# Patient Record
Sex: FEMALE | Race: WHITE | NOT HISPANIC OR LATINO | Employment: FULL TIME | URBAN - METROPOLITAN AREA
[De-identification: names, ages, dates, MRNs, and addresses within clinical notes are randomized per-mention and may not be internally consistent; named-entity substitution may affect disease eponyms.]

---

## 2020-01-26 ENCOUNTER — HOSPITAL ENCOUNTER (OUTPATIENT)
Facility: OTHER | Age: 60
Discharge: HOME | End: 2020-01-28
Attending: EMERGENCY MEDICINE | Admitting: EMERGENCY MEDICINE
Payer: COMMERCIAL

## 2020-01-26 DIAGNOSIS — K62.5 RECTAL BLEEDING: ICD-10-CM

## 2020-01-26 DIAGNOSIS — K55.9 ISCHEMIC COLITIS: Primary | ICD-10-CM

## 2020-01-26 DIAGNOSIS — K52.9 COLITIS: ICD-10-CM

## 2020-01-26 DIAGNOSIS — K92.2 LOWER GI BLEED: ICD-10-CM

## 2020-01-26 PROBLEM — K92.1 HEMATOCHEZIA: Status: ACTIVE | Noted: 2020-01-26

## 2020-01-26 LAB
ABO + RH BLD: NORMAL
ALBUMIN SERPL BCP-MCNC: 4.3 G/DL (ref 3.5–5.2)
ALP SERPL-CCNC: 89 U/L (ref 55–135)
ALT SERPL W/O P-5'-P-CCNC: 31 U/L (ref 10–44)
ANION GAP SERPL CALC-SCNC: 11 MMOL/L (ref 8–16)
AST SERPL-CCNC: 30 U/L (ref 10–40)
B-HCG UR QL: NEGATIVE
BASOPHILS # BLD AUTO: 0.03 K/UL (ref 0–0.2)
BASOPHILS NFR BLD: 0.3 % (ref 0–1.9)
BILIRUB SERPL-MCNC: 0.5 MG/DL (ref 0.1–1)
BLD GP AB SCN CELLS X3 SERPL QL: NORMAL
BUN SERPL-MCNC: 14 MG/DL (ref 6–20)
CALCIUM SERPL-MCNC: 9.5 MG/DL (ref 8.7–10.5)
CHLORIDE SERPL-SCNC: 105 MMOL/L (ref 95–110)
CO2 SERPL-SCNC: 24 MMOL/L (ref 23–29)
CREAT SERPL-MCNC: 0.8 MG/DL (ref 0.5–1.4)
CTP QC/QA: YES
DIFFERENTIAL METHOD: ABNORMAL
EOSINOPHIL # BLD AUTO: 0.2 K/UL (ref 0–0.5)
EOSINOPHIL NFR BLD: 1.8 % (ref 0–8)
ERYTHROCYTE [DISTWIDTH] IN BLOOD BY AUTOMATED COUNT: 12.5 % (ref 11.5–14.5)
EST. GFR  (AFRICAN AMERICAN): >60 ML/MIN/1.73 M^2
EST. GFR  (NON AFRICAN AMERICAN): >60 ML/MIN/1.73 M^2
GLUCOSE SERPL-MCNC: 126 MG/DL (ref 70–110)
HCT VFR BLD AUTO: 41.3 % (ref 37–48.5)
HGB BLD-MCNC: 13.9 G/DL (ref 12–16)
IMM GRANULOCYTES # BLD AUTO: 0.03 K/UL (ref 0–0.04)
IMM GRANULOCYTES NFR BLD AUTO: 0.3 % (ref 0–0.5)
INR PPP: 0.9 (ref 0.8–1.2)
LYMPHOCYTES # BLD AUTO: 0.7 K/UL (ref 1–4.8)
LYMPHOCYTES NFR BLD: 7.8 % (ref 18–48)
MCH RBC QN AUTO: 30.9 PG (ref 27–31)
MCHC RBC AUTO-ENTMCNC: 33.7 G/DL (ref 32–36)
MCV RBC AUTO: 92 FL (ref 82–98)
MONOCYTES # BLD AUTO: 0.4 K/UL (ref 0.3–1)
MONOCYTES NFR BLD: 4.6 % (ref 4–15)
NEUTROPHILS # BLD AUTO: 7.9 K/UL (ref 1.8–7.7)
NEUTROPHILS NFR BLD: 85.2 % (ref 38–73)
NRBC BLD-RTO: 0 /100 WBC
PLATELET # BLD AUTO: 219 K/UL (ref 150–350)
PMV BLD AUTO: 9.9 FL (ref 9.2–12.9)
POTASSIUM SERPL-SCNC: 4.1 MMOL/L (ref 3.5–5.1)
PROT SERPL-MCNC: 7.3 G/DL (ref 6–8.4)
PROTHROMBIN TIME: 10 SEC (ref 9–12.5)
RBC # BLD AUTO: 4.5 M/UL (ref 4–5.4)
SODIUM SERPL-SCNC: 140 MMOL/L (ref 136–145)
WBC # BLD AUTO: 9.3 K/UL (ref 3.9–12.7)

## 2020-01-26 PROCEDURE — S0030 INJECTION, METRONIDAZOLE: HCPCS | Performed by: EMERGENCY MEDICINE

## 2020-01-26 PROCEDURE — 81025 URINE PREGNANCY TEST: CPT | Performed by: EMERGENCY MEDICINE

## 2020-01-26 PROCEDURE — 99220 PR INITIAL OBSERVATION CARE,LEVL III: CPT | Mod: ,,, | Performed by: PHYSICIAN ASSISTANT

## 2020-01-26 PROCEDURE — 63600175 PHARM REV CODE 636 W HCPCS: Performed by: EMERGENCY MEDICINE

## 2020-01-26 PROCEDURE — 94761 N-INVAS EAR/PLS OXIMETRY MLT: CPT

## 2020-01-26 PROCEDURE — 25500020 PHARM REV CODE 255: Performed by: EMERGENCY MEDICINE

## 2020-01-26 PROCEDURE — 85610 PROTHROMBIN TIME: CPT

## 2020-01-26 PROCEDURE — 25000003 PHARM REV CODE 250: Performed by: INTERNAL MEDICINE

## 2020-01-26 PROCEDURE — 85025 COMPLETE CBC W/AUTO DIFF WBC: CPT

## 2020-01-26 PROCEDURE — 96376 TX/PRO/DX INJ SAME DRUG ADON: CPT

## 2020-01-26 PROCEDURE — 25000003 PHARM REV CODE 250: Performed by: EMERGENCY MEDICINE

## 2020-01-26 PROCEDURE — 99220 PR INITIAL OBSERVATION CARE,LEVL III: ICD-10-PCS | Mod: ,,, | Performed by: PHYSICIAN ASSISTANT

## 2020-01-26 PROCEDURE — G0378 HOSPITAL OBSERVATION PER HR: HCPCS

## 2020-01-26 PROCEDURE — 99285 EMERGENCY DEPT VISIT HI MDM: CPT | Mod: 25

## 2020-01-26 PROCEDURE — 96367 TX/PROPH/DG ADDL SEQ IV INF: CPT

## 2020-01-26 PROCEDURE — 96361 HYDRATE IV INFUSION ADD-ON: CPT

## 2020-01-26 PROCEDURE — 63600175 PHARM REV CODE 636 W HCPCS: Performed by: PHYSICIAN ASSISTANT

## 2020-01-26 PROCEDURE — 86850 RBC ANTIBODY SCREEN: CPT

## 2020-01-26 PROCEDURE — 80053 COMPREHEN METABOLIC PANEL: CPT

## 2020-01-26 PROCEDURE — 96375 TX/PRO/DX INJ NEW DRUG ADDON: CPT | Mod: 59

## 2020-01-26 PROCEDURE — 96365 THER/PROPH/DIAG IV INF INIT: CPT | Mod: 59

## 2020-01-26 RX ORDER — CIPROFLOXACIN 2 MG/ML
400 INJECTION, SOLUTION INTRAVENOUS
Status: COMPLETED | OUTPATIENT
Start: 2020-01-26 | End: 2020-01-26

## 2020-01-26 RX ORDER — POLYETHYLENE GLYCOL 3350, SODIUM SULFATE ANHYDROUS, SODIUM BICARBONATE, SODIUM CHLORIDE, POTASSIUM CHLORIDE 236; 22.74; 6.74; 5.86; 2.97 G/4L; G/4L; G/4L; G/4L; G/4L
2 POWDER, FOR SOLUTION ORAL ONCE
Status: COMPLETED | OUTPATIENT
Start: 2020-01-26 | End: 2020-01-26

## 2020-01-26 RX ORDER — METRONIDAZOLE 500 MG/100ML
500 INJECTION, SOLUTION INTRAVENOUS
Status: COMPLETED | OUTPATIENT
Start: 2020-01-26 | End: 2020-01-26

## 2020-01-26 RX ORDER — SODIUM CHLORIDE 9 MG/ML
INJECTION, SOLUTION INTRAVENOUS CONTINUOUS
Status: DISCONTINUED | OUTPATIENT
Start: 2020-01-26 | End: 2020-01-28 | Stop reason: HOSPADM

## 2020-01-26 RX ORDER — ONDANSETRON 2 MG/ML
4 INJECTION INTRAMUSCULAR; INTRAVENOUS EVERY 6 HOURS PRN
Status: DISCONTINUED | OUTPATIENT
Start: 2020-01-26 | End: 2020-01-28 | Stop reason: HOSPADM

## 2020-01-26 RX ORDER — HYDROMORPHONE HYDROCHLORIDE 1 MG/ML
1 INJECTION, SOLUTION INTRAMUSCULAR; INTRAVENOUS; SUBCUTANEOUS EVERY 4 HOURS PRN
Status: DISCONTINUED | OUTPATIENT
Start: 2020-01-26 | End: 2020-01-28 | Stop reason: HOSPADM

## 2020-01-26 RX ORDER — MORPHINE SULFATE 2 MG/ML
2 INJECTION, SOLUTION INTRAMUSCULAR; INTRAVENOUS EVERY 4 HOURS PRN
Status: DISCONTINUED | OUTPATIENT
Start: 2020-01-26 | End: 2020-01-28 | Stop reason: HOSPADM

## 2020-01-26 RX ORDER — POLYETHYLENE GLYCOL 3350, SODIUM SULFATE ANHYDROUS, SODIUM BICARBONATE, SODIUM CHLORIDE, POTASSIUM CHLORIDE 236; 22.74; 6.74; 5.86; 2.97 G/4L; G/4L; G/4L; G/4L; G/4L
2 POWDER, FOR SOLUTION ORAL ONCE
Status: COMPLETED | OUTPATIENT
Start: 2020-01-27 | End: 2020-01-27

## 2020-01-26 RX ORDER — ONDANSETRON 2 MG/ML
4 INJECTION INTRAMUSCULAR; INTRAVENOUS EVERY 8 HOURS PRN
Status: DISCONTINUED | OUTPATIENT
Start: 2020-01-26 | End: 2020-01-28 | Stop reason: HOSPADM

## 2020-01-26 RX ORDER — SODIUM CHLORIDE 0.9 % (FLUSH) 0.9 %
10 SYRINGE (ML) INJECTION
Status: DISCONTINUED | OUTPATIENT
Start: 2020-01-26 | End: 2020-01-28 | Stop reason: HOSPADM

## 2020-01-26 RX ORDER — SODIUM CHLORIDE 0.9 % (FLUSH) 0.9 %
10 SYRINGE (ML) INJECTION
Status: DISCONTINUED | OUTPATIENT
Start: 2020-01-26 | End: 2020-01-28

## 2020-01-26 RX ORDER — METRONIDAZOLE 500 MG/100ML
500 INJECTION, SOLUTION INTRAVENOUS
Status: DISCONTINUED | OUTPATIENT
Start: 2020-01-27 | End: 2020-01-28 | Stop reason: HOSPADM

## 2020-01-26 RX ORDER — CIPROFLOXACIN 2 MG/ML
400 INJECTION, SOLUTION INTRAVENOUS
Status: DISCONTINUED | OUTPATIENT
Start: 2020-01-27 | End: 2020-01-28 | Stop reason: HOSPADM

## 2020-01-26 RX ORDER — HYDROMORPHONE HYDROCHLORIDE 1 MG/ML
0.5 INJECTION, SOLUTION INTRAMUSCULAR; INTRAVENOUS; SUBCUTANEOUS
Status: COMPLETED | OUTPATIENT
Start: 2020-01-26 | End: 2020-01-26

## 2020-01-26 RX ORDER — ACETAMINOPHEN 325 MG/1
650 TABLET ORAL EVERY 4 HOURS PRN
Status: DISCONTINUED | OUTPATIENT
Start: 2020-01-26 | End: 2020-01-28 | Stop reason: HOSPADM

## 2020-01-26 RX ADMIN — IOHEXOL 75 ML: 350 INJECTION, SOLUTION INTRAVENOUS at 12:01

## 2020-01-26 RX ADMIN — SODIUM CHLORIDE 1000 ML: 0.9 INJECTION, SOLUTION INTRAVENOUS at 12:01

## 2020-01-26 RX ADMIN — HYDROMORPHONE HYDROCHLORIDE 1 MG: 1 INJECTION, SOLUTION INTRAMUSCULAR; INTRAVENOUS; SUBCUTANEOUS at 02:01

## 2020-01-26 RX ADMIN — METRONIDAZOLE 500 MG: 500 INJECTION, SOLUTION INTRAVENOUS at 12:01

## 2020-01-26 RX ADMIN — HYDROMORPHONE HYDROCHLORIDE 0.5 MG: 1 INJECTION, SOLUTION INTRAMUSCULAR; INTRAVENOUS; SUBCUTANEOUS at 12:01

## 2020-01-26 RX ADMIN — POLYETHYLENE GLYCOL 3350, SODIUM SULFATE ANHYDROUS, SODIUM BICARBONATE, SODIUM CHLORIDE, POTASSIUM CHLORIDE 2 L: 236; 22.74; 6.74; 5.86; 2.97 POWDER, FOR SOLUTION ORAL at 05:01

## 2020-01-26 RX ADMIN — CIPROFLOXACIN 400 MG: 2 INJECTION, SOLUTION INTRAVENOUS at 02:01

## 2020-01-26 RX ADMIN — SODIUM CHLORIDE: 0.9 INJECTION, SOLUTION INTRAVENOUS at 02:01

## 2020-01-26 NOTE — ASSESSMENT & PLAN NOTE
- sudden onset of abd pain associated with rectal bleeding, CT scan 1/26/2020 wall thickening of the sigmoid colon and distal/mid descending colon  - reported last colonoscopy normal  - possible ischemic colitis  - for now will continue Flagyl and cipro started in ED  - NPO   - IVF's  - H&H stable, monitor labs  - avoid hypotension  - GI consulted

## 2020-01-26 NOTE — CONSULTS
.[facility]  Gastroenterology  Consult Note    Patient Name: Deborah Richmond  MRN: [id number]  Admission Date: 1/26/2020  Hospital Length of Stay: 0 days  Code Status: Full Code   Primary Care Physician: Primary Doctor No  Principal Problem:<principal problem not specified>    Consults  Subjective:     Chief complaint:  Abdominal pain, blood in stool    HPI:  59-year-old woman who was in her usual state of health until early this morning when she was a woken with moderate to severe crampy lower abdominal pain, worse in the left lower quadrant. She went to the bathroom and had diarrhea.  The pain persisted and her next bowel movement was a bloody.  She denies any fever or chills.  No nausea or vomiting. At this point, she is still having pain but it has been relieved with Dilaudid.  No history of GI bleeding.  Her last colonoscopy was approximately 10 years ago and she says she is due for another one.      Past medical history:  History reviewed. No pertinent past medical history.    Past surgical history:  History reviewed. No pertinent surgical history.    Social history:  Social History     Socioeconomic History    Marital status:      Spouse name: Not on file    Number of children: Not on file    Years of education: Not on file    Highest education level: Not on file   Occupational History    Not on file   Social Needs    Financial resource strain: Not on file    Food insecurity:     Worry: Not on file     Inability: Not on file    Transportation needs:     Medical: Not on file     Non-medical: Not on file   Tobacco Use    Smoking status: Never Smoker   Substance and Sexual Activity    Alcohol use: Yes    Drug use: Never    Sexual activity: Yes     Partners: Male   Lifestyle    Physical activity:     Days per week: Not on file     Minutes per session: Not on file    Stress: Not on file   Relationships    Social connections:     Talks on phone: Not on file     Gets  together: Not on file     Attends Mu-ism service: Not on file     Active member of club or organization: Not on file     Attends meetings of clubs or organizations: Not on file     Relationship status: Not on file   Other Topics Concern    Not on file   Social History Narrative    Not on file       Family history:  History reviewed. No pertinent family history.    Medications:  No medications prior to admission.       Allergies:  Review of patient's allergies indicates:  No Known Allergies    Review of systems:  CONSTITUTIONAL: Negative for fever, chills, weakness, weight loss, weight gain.  HEENT: Negative for blurred vision, hearing loss, nasal congestion, dry mouth, sore throat.  CARDIOVASCULAR: Negative for chest pain or palpitations.  RESPIRATORY: Negative for SOB or cough.  GASTROINTESTINAL: See HPI  GENITOURINARY: Negative for dysuria or hematuria.  MUSCULOSKELETAL: Negative for osteoarthritis or muscle pain.  SKIN: Negative for rashes/lesions.  NEUROLOGIC: Negative for headaches, numbness/tingling.  ENDOCRINE: Negative excessive thirst.  HEMATOLOGIC:  No abnormal bruising.  Aside from above positives, complete 10 point review of systems negative.    Objective:     Vital Signs (Most Recent):  Temp: 99 °F (37.2 °C) (01/26/20 1357)  Pulse: (!) 57 (01/26/20 1357)  Resp: 18 (01/26/20 1357)  BP: 129/63 (01/26/20 1357)  SpO2: 99 % (01/26/20 1357) Vital Signs (24h Range):  Temp:  [98.7 °F (37.1 °C)-99.1 °F (37.3 °C)] 99 °F (37.2 °C)  Pulse:  [57-66] 57  Resp:  [18-27] 18  SpO2:  [99 %-100 %] 99 %  BP: (128-151)/(63-73) 129/63     Physical examination:  General: well developed, well nourished, no apparent distress  HENT: NCAT, hearing grossly intact, no palpable or visible thyroid mass  Eyes: PERRL, EOMI, anicteric sclera  LYMH: No cervical, supraclavicular or axillary lymphadenopathy   Cardiovascular: Regular rate and rhythm. No murmurs appreciated.  Lungs: Non-labored respirations. Breath sounds equal.    Abdomen: soft, mild left lower quadrant tenderness (after pain medications), no distention, no rebound, hypoactive bowel sounds  Extremities: No C/C/E, 2+ dorsalis pedis pulses bilaterally  Neuro: AA&O x 3, no asterixes or tremors  Psych: Appropriate mood and affect. No SI.  Skin: No jaundice, rashes or lesions  Musculoskeletal:  No deformity    Labs:  CBC:   Recent Labs   Lab 01/26/20  1053   WBC 9.30   HGB 13.9   HCT 41.3        CMP:   Recent Labs   Lab 01/26/20  1053   *   CALCIUM 9.5   ALBUMIN 4.3   PROT 7.3      K 4.1   CO2 24      BUN 14   CREATININE 0.8   ALKPHOS 89   ALT 31   AST 30   BILITOT 0.5       Imaging:  Imaging results within the past 24 hours have been reviewed.  CT:  1. Liquid stool within the rectum noting there is upstream wall thickening of the sigmoid colon and distal/mid descending colon suggesting sequela of infectious or inflammatory colitis.  Correlation is advised.  Follow-up colonoscopy is recommended to exclude underlying malignancy.  2. Hepatic steatosis with hepatomegaly, correlation with LFTs recommended.    Assessment:   Abdominal pain, diarrhea, hematochezia consistent with ischemic colitis.  CT findings are also consistent with this diagnosis.  A colonoscopy will be performed to confirm the diagnosis.       Plan:   1.  Colonoscopy tomorrow  2.  Keep well hydrated  3.  Pain control as needed  4.  Clear liquid diet and NPO after midnight         Thank you for your consult.     Paul Stone MD  Gastroenterology  [facility]

## 2020-01-26 NOTE — ED PROVIDER NOTES
"Encounter Date: 1/26/2020    SCRIBE #1 NOTE: I, Mariaa Galeas, am scribing for, and in the presence of, Dr. Brown.       History     Chief Complaint   Patient presents with    Rectal Bleeding     Pt reports rectal bleeding (pink)  which started at 2 am, states "It started with an upset stomach, then I started bleeding & cramping."     Time seen by provider: 10:31 AM    This is a 59 y.o. female with no significant medical hx who presents with complaint of rectal bleeding.  She reports waking up with 7/10 lower abdominal pain followed by diarrhea which was then followed by rectal bleeding described as dark red liquid 8.5 hours ago. The patient reports having at least 6 episodes of abdominal cramping followed by rectal bleeding since onset. She also reports one episode of chills. She denies blood dripping in her underwear - she states that she gets cramping, sits on the toilet, and then passes blood. The patient reports she last ate eggs and hash browns 11.5 hours ago and felt fine prior to onset of symptoms. She denies eating red foods such as beets recently. She denies fever, nausea, vomiting, CP, SOB, dysuria, rash, vaginal bleeding, hematuria, fatigue, dizziness, or flu-like symptoms. The patient denies hx of hemorrhoids and states her last colonoscopy was normal. She reports surgical hx of a breast augmentation. She reports alcohol use but denies tobacco or drug use.    The history is provided by the patient.     Review of patient's allergies indicates:  No Known Allergies  History reviewed. No pertinent past medical history.  No past surgical history on file.  History reviewed. No pertinent family history.  Social History     Tobacco Use    Smoking status: Not on file   Substance Use Topics    Alcohol use: Not on file    Drug use: Not on file     Review of Systems   Constitutional: Positive for chills. Negative for fatigue and fever.   HENT: Negative for congestion and sore throat.    Eyes: Negative for " visual disturbance.   Respiratory: Negative for cough and shortness of breath.    Cardiovascular: Negative for chest pain and palpitations.   Gastrointestinal: Positive for abdominal pain, anal bleeding and diarrhea. Negative for nausea and vomiting.   Genitourinary: Negative for decreased urine volume, dysuria, hematuria and vaginal discharge.   Musculoskeletal: Negative for joint swelling, neck pain and neck stiffness.   Skin: Negative for rash and wound.   Neurological: Negative for dizziness, weakness and numbness.   Psychiatric/Behavioral: Negative for confusion.   All other systems reviewed and are negative.      Physical Exam     Initial Vitals [01/26/20 0928]   BP Pulse Resp Temp SpO2   128/65 66 18 99.1 °F (37.3 °C) 100 %      MAP       --         Physical Exam    Nursing note and vitals reviewed.  Constitutional: She appears well-developed and well-nourished. She is not diaphoretic. No distress.   HENT:   Head: Normocephalic and atraumatic.   Right Ear: External ear normal.   Left Ear: External ear normal.   Nose: Nose normal.   Mouth/Throat: Oropharynx is clear and moist. No oropharyngeal exudate.   Eyes: Conjunctivae and EOM are normal. Pupils are equal, round, and reactive to light. No scleral icterus.   Neck: Normal range of motion. Neck supple. No JVD present.   Cardiovascular: Normal rate, regular rhythm, normal heart sounds and intact distal pulses. Exam reveals no gallop and no friction rub.    No murmur heard.  Pulmonary/Chest: Breath sounds normal. No respiratory distress. She has no wheezes. She has no rhonchi. She has no rales.   Abdominal: Soft. There is tenderness. There is no rebound and no guarding.   Hyperactive bowel sounds.  Tenderness in suprapubic area and bilateral lower quadrants.   Genitourinary:   Genitourinary Comments: Minimal to no stool present.  No obvious internal or external hemorrhoids.  External skin tag.  Hemoccult negative.   Musculoskeletal: Normal range of motion. She  exhibits no edema or tenderness.   No leg tenderness or edema.   Neurological: She is alert and oriented to person, place, and time. GCS score is 15. GCS eye subscore is 4. GCS verbal subscore is 5. GCS motor subscore is 6.   Skin: Skin is warm and dry. No rash noted. No erythema. No pallor.   Psychiatric: She has a normal mood and affect. Thought content normal.         ED Course   Procedures  Labs Reviewed   CBC W/ AUTO DIFFERENTIAL - Abnormal; Notable for the following components:       Result Value    Gran # (ANC) 7.9 (*)     Lymph # 0.7 (*)     Gran% 85.2 (*)     Lymph% 7.8 (*)     All other components within normal limits   COMPREHENSIVE METABOLIC PANEL - Abnormal; Notable for the following components:    Glucose 126 (*)     All other components within normal limits   PROTIME-INR   POCT URINE PREGNANCY   TYPE & SCREEN          Imaging Results           CT Abdomen Pelvis With Contrast (Final result)  Result time 01/26/20 12:34:22    Final result by Tadeo Guo MD (01/26/20 12:34:22)                 Impression:      This report was flagged in Epic as abnormal.    1. Liquid stool within the rectum noting there is upstream wall thickening of the sigmoid colon and distal/mid descending colon suggesting sequela of infectious or inflammatory colitis.  Correlation is advised.  Follow-up colonoscopy is recommended to exclude underlying malignancy.  2. Hepatic steatosis with hepatomegaly, correlation with LFTs recommended.  3. Additional findings above.      Electronically signed by: Tadeo Guo MD  Date:    01/26/2020  Time:    12:34             Narrative:    EXAMINATION:  CT ABDOMEN PELVIS WITH CONTRAST    CLINICAL HISTORY:  GI bleeding;LLQ pain, suspect diverticulitis;    TECHNIQUE:  Low dose axial images, sagittal and coronal reformations were obtained from the lung bases to the pubic symphysis following the IV administration of 75 mL of Omnipaque 350 .  Oral contrast was not  given.    COMPARISON:  None.    FINDINGS:  Images of the lower thorax are remarkable for a calcified granuloma within the right lower lobe.  There is bilateral basilar dependent atelectasis/scarring.    The liver is hypoattenuating, may reflect steatosis, correlation with LFTs recommended.  The liver is enlarged.  The spleen, pancreas, gallbladder and adrenal glands are unremarkable.  There is no biliary dilation or ascites.  The portal vein, splenic vein, SMV, celiac axis and SMA all are patent.  The stomach is decompressed limiting evaluation.  No significant abdominal lymphadenopathy.    The kidneys enhance symmetrically without hydronephrosis or nephrolithiasis.  The bilateral ureters are unremarkable without calculi seen.  Please note, the ureters are unable to be followed in their entirety to the urinary bladder, no secondary findings to suggest obstructive uropathy.  The urinary bladder is decompressed.  The uterus and adnexa is unremarkable.    There is liquid stool within the rectum.  There is inflammation and wall thickening involving the mid to distal sigmoid colon, with additional regions of inflammation involving the distal/mid descending colon.  The terminal ileum is unremarkable.  The appendix is not confidently identified, no pericecal inflammation.  No focal organized pelvic fluid collection.    Degenerative changes are noted of the spine.  No significant inguinal lymphadenopathy.  Breast prostheses noted.                                 Medical Decision Making:   History:   Old Medical Records: I decided to obtain old medical records.  Clinical Tests:   Lab Tests: Ordered and Reviewed  ED Management:  Emergent evaluation a 59-year-old female who presents with complaint of rectal bleeding.  Vital signs are benign, afebrile.  On exam there is no stool present in the rectum, no obvious bleeding hemorrhoids.  Abdomen is tender without surgical sign. CT scan shows colitis and liquid stool present in the  rectum.  I am concerned for possible diverticular bleed.  H&H is within normal limits, no anemia or need for emergent transfusion.  She is treated with IV metronidazole and Cipro for colitis.  She is admitted to the hospitalist service for further care and monitoring.            Scribe Attestation:   Scribe #1: I performed the above scribed service and the documentation accurately describes the services I performed. I attest to the accuracy of the note.    Attending Attestation:           Physician Attestation for Scribe:  Physician Attestation Statement for Scribe #1: I, Dr. Brown, reviewed documentation, as scribed by Mariaa Galeas in my presence, and it is both accurate and complete.                               Clinical Impression:     1. Lower GI bleed    2. Colitis                                Ashlee Brown MD  01/26/20 7169

## 2020-01-26 NOTE — HPI
60 y/o female with no significant PMH presents with c/o sudden onset of abdominal pain with associated diarrhea, rectal bleeding and chills. She reports multiple episodes of abdominal cramping followed by rectal bleeding since onset. Patient reports she is visiting and went out last night and had a couple of drinks, nothing out of the ordinary, ate eggs and hash browns last night and felt fine prior to onset of symptoms. Denies fever, nausea, vomiting, CP, SOB, dysuria, rash, vaginal bleeding, hematuria, fatigue, dizziness, or flu-like symptoms. The patient denies hx of hemorrhoids and states her last colonoscopy was normal. She reports alcohol use but denies tobacco or drug use. ED evaluation labs and vitals unremarkable, CT A/P showed Liquid stool within the rectum noting there is upstream wall thickening of the sigmoid colon and distal/mid descending colon suggesting sequela of infectious or inflammatory colitis. Patient placed in Observation.

## 2020-01-26 NOTE — H&P
"[facility] Hospital Medicine  History & Physical    Patient Name: Deborah Richmond  MRN: [id number]  Admission Date: 1/26/2020  Attending Physician: MATTY Mac MD   Primary Care Provider: Primary Doctor No         Patient information was obtained from patient, spouse/SO and ER records.     Subjective:     Principal Problem:<principal problem not specified>    Chief Complaint:   Chief Complaint   Patient presents with    Rectal Bleeding     Pt reports rectal bleeding (pink)  which started at 2 am, states "It started with an upset stomach, then I started bleeding & cramping."        HPI: 58 y/o female with no significant PMH presents with c/o sudden onset of abdominal pain with associated diarrhea, rectal bleeding and chills. She reports multiple episodes of abdominal cramping followed by rectal bleeding since onset. Patient reports she is visiting and went out last night and had a couple of drinks, nothing out of the ordinary, ate eggs and hash browns last night and felt fine prior to onset of symptoms. Denies fever, nausea, vomiting, CP, SOB, dysuria, rash, vaginal bleeding, hematuria, fatigue, dizziness, or flu-like symptoms. The patient denies hx of hemorrhoids and states her last colonoscopy was normal. She reports alcohol use but denies tobacco or drug use. ED evaluation labs and vitals unremarkable, CT A/P showed Liquid stool within the rectum noting there is upstream wall thickening of the sigmoid colon and distal/mid descending colon suggesting sequela of infectious or inflammatory colitis. Patient placed in Observation.     History reviewed. No pertinent past medical history.    History reviewed. No pertinent surgical history.    Review of patient's allergies indicates:  No Known Allergies    No current facility-administered medications on file prior to encounter.      No current outpatient medications on file prior to encounter.     Family History     None        Tobacco Use    " Smoking status: Never Smoker   Substance and Sexual Activity    Alcohol use: Yes    Drug use: Never    Sexual activity: Yes     Partners: Male     Review of Systems   Constitutional: Negative for chills and fever.   HENT: Negative for congestion and trouble swallowing.    Eyes: Negative for discharge and visual disturbance.   Respiratory: Negative for cough, chest tightness and shortness of breath.    Cardiovascular: Negative for chest pain, palpitations and leg swelling.   Gastrointestinal: Positive for abdominal pain, blood in stool and diarrhea. Negative for abdominal distention, nausea and vomiting.   Genitourinary: Negative for difficulty urinating, dysuria, frequency, hematuria and urgency.   Musculoskeletal: Negative for arthralgias and myalgias.   Skin: Negative for rash and wound.   Neurological: Negative for dizziness, syncope, light-headedness, numbness and headaches.   Hematological: Does not bruise/bleed easily.   Psychiatric/Behavioral: Negative for confusion.     Objective:     Vital Signs (Most Recent):  Temp: 99 °F (37.2 °C) (01/26/20 1357)  Pulse: (!) 57 (01/26/20 1357)  Resp: 18 (01/26/20 1357)  BP: 129/63 (01/26/20 1357)  SpO2: 99 % (01/26/20 1357) Vital Signs (24h Range):  Temp:  [98.7 °F (37.1 °C)-99.1 °F (37.3 °C)] 99 °F (37.2 °C)  Pulse:  [57-66] 57  Resp:  [18-27] 18  SpO2:  [99 %-100 %] 99 %  BP: (128-151)/(63-73) 129/63     Weight: 70.6 kg (155 lb 10.3 oz)  Body mass index is 24.38 kg/m².    Physical Exam   Constitutional: She is oriented to person, place, and time. She appears well-developed and well-nourished. No distress.   HENT:   Head: Normocephalic and atraumatic.   Eyes: Conjunctivae are normal. No scleral icterus.   Neck: Normal range of motion. Neck supple.   Cardiovascular: Normal rate, regular rhythm, normal heart sounds and intact distal pulses.   Pulmonary/Chest: Effort normal and breath sounds normal. No respiratory distress. She has no wheezes.   Abdominal: Soft. Bowel  sounds are normal. She exhibits no distension and no mass. There is tenderness (TTP RLQ). There is no rebound.   Musculoskeletal: Normal range of motion. She exhibits no edema.   Neurological: She is alert and oriented to person, place, and time.   Skin: Skin is warm and dry. Capillary refill takes less than 2 seconds. She is not diaphoretic.   Psychiatric: She has a normal mood and affect.   Nursing note and vitals reviewed.          Significant Labs:   CBC:   Recent Labs   Lab 01/26/20  1053   WBC 9.30   HGB 13.9   HCT 41.3        CMP:   Recent Labs   Lab 01/26/20  1053      K 4.1      CO2 24   *   BUN 14   CREATININE 0.8   CALCIUM 9.5   PROT 7.3   ALBUMIN 4.3   BILITOT 0.5   ALKPHOS 89   AST 30   ALT 31   ANIONGAP 11   EGFRNONAA >60     All pertinent labs within the past 24 hours have been reviewed.    Significant Imaging: I have reviewed all pertinent imaging results/findings within the past 24 hours.   Imaging Results           CT Abdomen Pelvis With Contrast (Final result)  Result time 01/26/20 12:34:22    Final result by Tadeo Guo MD (01/26/20 12:34:22)                 Impression:      This report was flagged in Epic as abnormal.    1. Liquid stool within the rectum noting there is upstream wall thickening of the sigmoid colon and distal/mid descending colon suggesting sequela of infectious or inflammatory colitis.  Correlation is advised.  Follow-up colonoscopy is recommended to exclude underlying malignancy.  2. Hepatic steatosis with hepatomegaly, correlation with LFTs recommended.  3. Additional findings above.      Electronically signed by: Tadeo Guo MD  Date:    01/26/2020  Time:    12:34             Narrative:    EXAMINATION:  CT ABDOMEN PELVIS WITH CONTRAST    CLINICAL HISTORY:  GI bleeding;LLQ pain, suspect diverticulitis;    TECHNIQUE:  Low dose axial images, sagittal and coronal reformations were obtained from the lung bases to the pubic symphysis following  the IV administration of 75 mL of Omnipaque 350 .  Oral contrast was not given.    COMPARISON:  None.    FINDINGS:  Images of the lower thorax are remarkable for a calcified granuloma within the right lower lobe.  There is bilateral basilar dependent atelectasis/scarring.    The liver is hypoattenuating, may reflect steatosis, correlation with LFTs recommended.  The liver is enlarged.  The spleen, pancreas, gallbladder and adrenal glands are unremarkable.  There is no biliary dilation or ascites.  The portal vein, splenic vein, SMV, celiac axis and SMA all are patent.  The stomach is decompressed limiting evaluation.  No significant abdominal lymphadenopathy.    The kidneys enhance symmetrically without hydronephrosis or nephrolithiasis.  The bilateral ureters are unremarkable without calculi seen.  Please note, the ureters are unable to be followed in their entirety to the urinary bladder, no secondary findings to suggest obstructive uropathy.  The urinary bladder is decompressed.  The uterus and adnexa is unremarkable.    There is liquid stool within the rectum.  There is inflammation and wall thickening involving the mid to distal sigmoid colon, with additional regions of inflammation involving the distal/mid descending colon.  The terminal ileum is unremarkable.  The appendix is not confidently identified, no pericecal inflammation.  No focal organized pelvic fluid collection.    Degenerative changes are noted of the spine.  No significant inguinal lymphadenopathy.  Breast prostheses noted.                                  Assessment/Plan:     Colitis  - sudden onset of abd pain associated with rectal bleeding, CT scan 1/26/2020 wall thickening of the sigmoid colon and distal/mid descending colon  - reported last colonoscopy normal  - possible ischemic colitis  - for now will continue Flagyl and cipro started in ED  - NPO   - IVF's  - H&H stable, monitor labs  - avoid hypotension  - GI consulted      Rectal  bleeding  - see colitis      VTE Risk Mitigation (From admission, onward)         Ordered     Place sequential compression device  Until discontinued      01/26/20 1400     IP VTE LOW RISK PATIENT  Once      01/26/20 1400                   Rita Smith PA-C  Department of Hospital Medicine   [facility]

## 2020-01-27 ENCOUNTER — ANESTHESIA (OUTPATIENT)
Dept: ENDOSCOPY | Facility: OTHER | Age: 60
End: 2020-01-27
Payer: COMMERCIAL

## 2020-01-27 ENCOUNTER — ANESTHESIA EVENT (OUTPATIENT)
Dept: ENDOSCOPY | Facility: OTHER | Age: 60
End: 2020-01-27
Payer: COMMERCIAL

## 2020-01-27 LAB
ALBUMIN SERPL BCP-MCNC: 3.6 G/DL (ref 3.5–5.2)
ALP SERPL-CCNC: 70 U/L (ref 55–135)
ALT SERPL W/O P-5'-P-CCNC: 22 U/L (ref 10–44)
ANION GAP SERPL CALC-SCNC: 8 MMOL/L (ref 8–16)
AST SERPL-CCNC: 19 U/L (ref 10–40)
BASOPHILS # BLD AUTO: 0.03 K/UL (ref 0–0.2)
BASOPHILS NFR BLD: 0.3 % (ref 0–1.9)
BILIRUB SERPL-MCNC: 0.9 MG/DL (ref 0.1–1)
BUN SERPL-MCNC: 7 MG/DL (ref 6–20)
CALCIUM SERPL-MCNC: 8.9 MG/DL (ref 8.7–10.5)
CHLORIDE SERPL-SCNC: 106 MMOL/L (ref 95–110)
CO2 SERPL-SCNC: 28 MMOL/L (ref 23–29)
CREAT SERPL-MCNC: 0.8 MG/DL (ref 0.5–1.4)
DIFFERENTIAL METHOD: NORMAL
EOSINOPHIL # BLD AUTO: 0 K/UL (ref 0–0.5)
EOSINOPHIL NFR BLD: 0.2 % (ref 0–8)
ERYTHROCYTE [DISTWIDTH] IN BLOOD BY AUTOMATED COUNT: 12.7 % (ref 11.5–14.5)
EST. GFR  (AFRICAN AMERICAN): >60 ML/MIN/1.73 M^2
EST. GFR  (NON AFRICAN AMERICAN): >60 ML/MIN/1.73 M^2
GLUCOSE SERPL-MCNC: 124 MG/DL (ref 70–110)
HCT VFR BLD AUTO: 41.6 % (ref 37–48.5)
HGB BLD-MCNC: 13.3 G/DL (ref 12–16)
IMM GRANULOCYTES # BLD AUTO: 0.02 K/UL (ref 0–0.04)
IMM GRANULOCYTES NFR BLD AUTO: 0.2 % (ref 0–0.5)
LYMPHOCYTES # BLD AUTO: 1.8 K/UL (ref 1–4.8)
LYMPHOCYTES NFR BLD: 19.6 % (ref 18–48)
MAGNESIUM SERPL-MCNC: 1.7 MG/DL (ref 1.6–2.6)
MCH RBC QN AUTO: 30.2 PG (ref 27–31)
MCHC RBC AUTO-ENTMCNC: 32 G/DL (ref 32–36)
MCV RBC AUTO: 95 FL (ref 82–98)
MONOCYTES # BLD AUTO: 0.7 K/UL (ref 0.3–1)
MONOCYTES NFR BLD: 7.2 % (ref 4–15)
NEUTROPHILS # BLD AUTO: 6.5 K/UL (ref 1.8–7.7)
NEUTROPHILS NFR BLD: 72.5 % (ref 38–73)
NRBC BLD-RTO: 0 /100 WBC
PHOSPHATE SERPL-MCNC: 3.2 MG/DL (ref 2.7–4.5)
PLATELET # BLD AUTO: 199 K/UL (ref 150–350)
PMV BLD AUTO: 9.7 FL (ref 9.2–12.9)
POTASSIUM SERPL-SCNC: 4.2 MMOL/L (ref 3.5–5.1)
PROT SERPL-MCNC: 6.3 G/DL (ref 6–8.4)
RBC # BLD AUTO: 4.4 M/UL (ref 4–5.4)
SODIUM SERPL-SCNC: 142 MMOL/L (ref 136–145)
WBC # BLD AUTO: 9 K/UL (ref 3.9–12.7)

## 2020-01-27 PROCEDURE — 96376 TX/PRO/DX INJ SAME DRUG ADON: CPT | Mod: 59

## 2020-01-27 PROCEDURE — 99226 PR SUBSEQUENT OBSERVATION CARE,LEVEL III: CPT | Mod: ,,, | Performed by: PHYSICIAN ASSISTANT

## 2020-01-27 PROCEDURE — 25000003 PHARM REV CODE 250: Performed by: INTERNAL MEDICINE

## 2020-01-27 PROCEDURE — 88305 TISSUE EXAM BY PATHOLOGIST: ICD-10-PCS | Mod: 26,,, | Performed by: PATHOLOGY

## 2020-01-27 PROCEDURE — 36415 COLL VENOUS BLD VENIPUNCTURE: CPT

## 2020-01-27 PROCEDURE — 88342 IMHCHEM/IMCYTCHM 1ST ANTB: CPT | Performed by: PATHOLOGY

## 2020-01-27 PROCEDURE — 88305 TISSUE EXAM BY PATHOLOGIST: CPT | Performed by: PATHOLOGY

## 2020-01-27 PROCEDURE — 63600175 PHARM REV CODE 636 W HCPCS: Performed by: PHYSICIAN ASSISTANT

## 2020-01-27 PROCEDURE — 88342 CHG IMMUNOCYTOCHEMISTRY: ICD-10-PCS | Mod: 26,,, | Performed by: PATHOLOGY

## 2020-01-27 PROCEDURE — 63600175 PHARM REV CODE 636 W HCPCS: Performed by: ANESTHESIOLOGY

## 2020-01-27 PROCEDURE — S0030 INJECTION, METRONIDAZOLE: HCPCS | Performed by: PHYSICIAN ASSISTANT

## 2020-01-27 PROCEDURE — 27201012 HC FORCEPS, HOT/COLD, DISP: Performed by: INTERNAL MEDICINE

## 2020-01-27 PROCEDURE — 37000009 HC ANESTHESIA EA ADD 15 MINS: Performed by: INTERNAL MEDICINE

## 2020-01-27 PROCEDURE — 25000003 PHARM REV CODE 250: Performed by: PHYSICIAN ASSISTANT

## 2020-01-27 PROCEDURE — 45380 COLONOSCOPY AND BIOPSY: CPT | Performed by: INTERNAL MEDICINE

## 2020-01-27 PROCEDURE — 83735 ASSAY OF MAGNESIUM: CPT

## 2020-01-27 PROCEDURE — 94761 N-INVAS EAR/PLS OXIMETRY MLT: CPT

## 2020-01-27 PROCEDURE — S0030 INJECTION, METRONIDAZOLE: HCPCS | Performed by: INTERNAL MEDICINE

## 2020-01-27 PROCEDURE — 37000008 HC ANESTHESIA 1ST 15 MINUTES: Performed by: INTERNAL MEDICINE

## 2020-01-27 PROCEDURE — 96375 TX/PRO/DX INJ NEW DRUG ADDON: CPT

## 2020-01-27 PROCEDURE — 63600175 PHARM REV CODE 636 W HCPCS: Performed by: NURSE ANESTHETIST, CERTIFIED REGISTERED

## 2020-01-27 PROCEDURE — 85025 COMPLETE CBC W/AUTO DIFF WBC: CPT

## 2020-01-27 PROCEDURE — G0378 HOSPITAL OBSERVATION PER HR: HCPCS

## 2020-01-27 PROCEDURE — 63600175 PHARM REV CODE 636 W HCPCS: Performed by: INTERNAL MEDICINE

## 2020-01-27 PROCEDURE — 99226 PR SUBSEQUENT OBSERVATION CARE,LEVEL III: ICD-10-PCS | Mod: ,,, | Performed by: PHYSICIAN ASSISTANT

## 2020-01-27 PROCEDURE — 88342 IMHCHEM/IMCYTCHM 1ST ANTB: CPT | Mod: 26,,, | Performed by: PATHOLOGY

## 2020-01-27 PROCEDURE — 80053 COMPREHEN METABOLIC PANEL: CPT

## 2020-01-27 PROCEDURE — 88305 TISSUE EXAM BY PATHOLOGIST: CPT | Mod: 26,,, | Performed by: PATHOLOGY

## 2020-01-27 PROCEDURE — 84100 ASSAY OF PHOSPHORUS: CPT

## 2020-01-27 RX ORDER — MEPERIDINE HYDROCHLORIDE 25 MG/ML
12.5 INJECTION INTRAMUSCULAR; INTRAVENOUS; SUBCUTANEOUS ONCE AS NEEDED
Status: DISCONTINUED | OUTPATIENT
Start: 2020-01-27 | End: 2020-01-27

## 2020-01-27 RX ORDER — ONDANSETRON HYDROCHLORIDE 2 MG/ML
INJECTION, SOLUTION INTRAMUSCULAR; INTRAVENOUS
Status: DISCONTINUED | OUTPATIENT
Start: 2020-01-27 | End: 2020-01-27

## 2020-01-27 RX ORDER — OXYCODONE HYDROCHLORIDE 5 MG/1
5 TABLET ORAL
Status: DISCONTINUED | OUTPATIENT
Start: 2020-01-27 | End: 2020-01-27

## 2020-01-27 RX ORDER — ONDANSETRON 2 MG/ML
4 INJECTION INTRAMUSCULAR; INTRAVENOUS DAILY PRN
Status: DISCONTINUED | OUTPATIENT
Start: 2020-01-27 | End: 2020-01-27

## 2020-01-27 RX ORDER — HYDROMORPHONE HYDROCHLORIDE 2 MG/ML
0.4 INJECTION, SOLUTION INTRAMUSCULAR; INTRAVENOUS; SUBCUTANEOUS EVERY 5 MIN PRN
Status: DISCONTINUED | OUTPATIENT
Start: 2020-01-27 | End: 2020-01-27

## 2020-01-27 RX ORDER — SODIUM CHLORIDE, SODIUM LACTATE, POTASSIUM CHLORIDE, CALCIUM CHLORIDE 600; 310; 30; 20 MG/100ML; MG/100ML; MG/100ML; MG/100ML
INJECTION, SOLUTION INTRAVENOUS CONTINUOUS PRN
Status: DISCONTINUED | OUTPATIENT
Start: 2020-01-27 | End: 2020-01-27

## 2020-01-27 RX ORDER — PROPOFOL 10 MG/ML
VIAL (ML) INTRAVENOUS
Status: DISCONTINUED | OUTPATIENT
Start: 2020-01-27 | End: 2020-01-27

## 2020-01-27 RX ORDER — SODIUM CHLORIDE 0.9 % (FLUSH) 0.9 %
3 SYRINGE (ML) INJECTION
Status: DISCONTINUED | OUTPATIENT
Start: 2020-01-27 | End: 2020-01-28

## 2020-01-27 RX ORDER — LIDOCAINE HCL/PF 100 MG/5ML
SYRINGE (ML) INTRAVENOUS
Status: DISCONTINUED | OUTPATIENT
Start: 2020-01-27 | End: 2020-01-27

## 2020-01-27 RX ADMIN — PROPOFOL 100 MG: 10 INJECTION, EMULSION INTRAVENOUS at 07:01

## 2020-01-27 RX ADMIN — SODIUM CHLORIDE, SODIUM LACTATE, POTASSIUM CHLORIDE, AND CALCIUM CHLORIDE: .6; .31; .03; .02 INJECTION, SOLUTION INTRAVENOUS at 07:01

## 2020-01-27 RX ADMIN — METRONIDAZOLE 500 MG: 500 INJECTION, SOLUTION INTRAVENOUS at 04:01

## 2020-01-27 RX ADMIN — ACETAMINOPHEN 650 MG: 325 TABLET ORAL at 11:01

## 2020-01-27 RX ADMIN — CIPROFLOXACIN 400 MG: 2 INJECTION, SOLUTION INTRAVENOUS at 01:01

## 2020-01-27 RX ADMIN — POLYETHYLENE GLYCOL 3350, SODIUM SULFATE ANHYDROUS, SODIUM BICARBONATE, SODIUM CHLORIDE, POTASSIUM CHLORIDE 2 L: 236; 22.74; 6.74; 5.86; 2.97 POWDER, FOR SOLUTION ORAL at 02:01

## 2020-01-27 RX ADMIN — CIPROFLOXACIN 400 MG: 2 INJECTION, SOLUTION INTRAVENOUS at 02:01

## 2020-01-27 RX ADMIN — METRONIDAZOLE 500 MG: 500 INJECTION, SOLUTION INTRAVENOUS at 08:01

## 2020-01-27 RX ADMIN — LIDOCAINE HYDROCHLORIDE 50 MG: 20 INJECTION, SOLUTION INTRAVENOUS at 07:01

## 2020-01-27 RX ADMIN — METRONIDAZOLE 500 MG: 500 INJECTION, SOLUTION INTRAVENOUS at 12:01

## 2020-01-27 RX ADMIN — ONDANSETRON 4 MG: 2 INJECTION, SOLUTION INTRAMUSCULAR; INTRAVENOUS at 07:01

## 2020-01-27 NOTE — TRANSFER OF CARE
"Anesthesia Transfer of Care Note    Patient: Deborah Richmond    Procedure(s) Performed: Procedure(s) (LRB):  COLONOSCOPY (N/A)    Patient location: PACU    Anesthesia Type: general    Transport from OR: Transported from OR on 2-3 L/min O2 by NC with adequate spontaneous ventilation    Post pain: adequate analgesia    Post assessment: no apparent anesthetic complications    Post vital signs: stable    Level of consciousness: awake, alert and oriented    Nausea/Vomiting: no nausea/vomiting    Complications: none    Transfer of care protocol was followed      Last vitals:   Visit Vitals  BP (!) 114/56 (BP Location: Left arm, Patient Position: Lying)   Pulse (!) 53   Temp 37.1 °C (98.7 °F) (Oral)   Resp 18   Ht 5' 7" (1.702 m)   Wt 70.6 kg (155 lb 10.3 oz)   LMP  (LMP Unknown)   SpO2 100%   Breastfeeding? No   BMI 24.38 kg/m²     "

## 2020-01-27 NOTE — SUBJECTIVE & OBJECTIVE
Interval History: s/p colonoscopy    Review of Systems   Constitutional: Negative for chills and fever.   Respiratory: Negative for cough and shortness of breath.    Cardiovascular: Negative for chest pain, palpitations and leg swelling.   Gastrointestinal: Positive for abdominal pain. Negative for abdominal distention, blood in stool, diarrhea, nausea and vomiting.   Musculoskeletal: Negative for myalgias.   Neurological: Negative for dizziness, syncope and light-headedness.     Objective:     Vital Signs (Most Recent):  Temp: 98.5 °F (36.9 °C) (01/27/20 0826)  Pulse: 88 (01/27/20 0826)  Resp: 16 (01/27/20 0826)  BP: 132/60 (01/27/20 0810)  SpO2: (!) 70 % (01/27/20 0826) Vital Signs (24h Range):  Temp:  [98.2 °F (36.8 °C)-99 °F (37.2 °C)] 98.5 °F (36.9 °C)  Pulse:  [51-88] 88  Resp:  [16-27] 16  SpO2:  [70 %-100 %] 70 %  BP: (114-151)/(55-80) 132/60     Weight: 70.6 kg (155 lb 10.3 oz)  Body mass index is 24.38 kg/m².    Intake/Output Summary (Last 24 hours) at 1/27/2020 0931  Last data filed at 1/27/2020 0826  Gross per 24 hour   Intake 400 ml   Output 0 ml   Net 400 ml      Physical Exam   Constitutional: She is oriented to person, place, and time. She appears well-developed and well-nourished. No distress.   HENT:   Head: Normocephalic and atraumatic.   Eyes: Conjunctivae are normal. No scleral icterus.   Neck: Normal range of motion. Neck supple.   Cardiovascular: Normal rate, regular rhythm, normal heart sounds and intact distal pulses.   Pulmonary/Chest: Effort normal and breath sounds normal. No respiratory distress. She has no wheezes.   Abdominal: Soft. Bowel sounds are normal. She exhibits no distension and no mass. There is tenderness (TTP RLQ). There is no rebound.   Musculoskeletal: Normal range of motion. She exhibits no edema.   Neurological: She is alert and oriented to person, place, and time.   Skin: Skin is warm and dry. Capillary refill takes less than 2 seconds. She is not diaphoretic.    Psychiatric: She has a normal mood and affect.   Nursing note and vitals reviewed.      Significant Labs:   CBC:   Recent Labs   Lab 01/26/20  1053 01/27/20  0414   WBC 9.30 9.00   HGB 13.9 13.3   HCT 41.3 41.6    199     CMP:   Recent Labs   Lab 01/26/20  1053 01/27/20  0414    142   K 4.1 4.2    106   CO2 24 28   * 124*   BUN 14 7   CREATININE 0.8 0.8   CALCIUM 9.5 8.9   PROT 7.3 6.3   ALBUMIN 4.3 3.6   BILITOT 0.5 0.9   ALKPHOS 89 70   AST 30 19   ALT 31 22   ANIONGAP 11 8   EGFRNONAA >60 >60     All pertinent labs within the past 24 hours have been reviewed.    Significant Imaging:   Imaging Results           CT Abdomen Pelvis With Contrast (Final result)  Result time 01/26/20 12:34:22    Final result by Tadeo Guo MD (01/26/20 12:34:22)                 Impression:      This report was flagged in Epic as abnormal.    1. Liquid stool within the rectum noting there is upstream wall thickening of the sigmoid colon and distal/mid descending colon suggesting sequela of infectious or inflammatory colitis.  Correlation is advised.  Follow-up colonoscopy is recommended to exclude underlying malignancy.  2. Hepatic steatosis with hepatomegaly, correlation with LFTs recommended.  3. Additional findings above.      Electronically signed by: Tadeo Guo MD  Date:    01/26/2020  Time:    12:34             Narrative:    EXAMINATION:  CT ABDOMEN PELVIS WITH CONTRAST    CLINICAL HISTORY:  GI bleeding;LLQ pain, suspect diverticulitis;    TECHNIQUE:  Low dose axial images, sagittal and coronal reformations were obtained from the lung bases to the pubic symphysis following the IV administration of 75 mL of Omnipaque 350 .  Oral contrast was not given.    COMPARISON:  None.    FINDINGS:  Images of the lower thorax are remarkable for a calcified granuloma within the right lower lobe.  There is bilateral basilar dependent atelectasis/scarring.    The liver is hypoattenuating, may reflect  steatosis, correlation with LFTs recommended.  The liver is enlarged.  The spleen, pancreas, gallbladder and adrenal glands are unremarkable.  There is no biliary dilation or ascites.  The portal vein, splenic vein, SMV, celiac axis and SMA all are patent.  The stomach is decompressed limiting evaluation.  No significant abdominal lymphadenopathy.    The kidneys enhance symmetrically without hydronephrosis or nephrolithiasis.  The bilateral ureters are unremarkable without calculi seen.  Please note, the ureters are unable to be followed in their entirety to the urinary bladder, no secondary findings to suggest obstructive uropathy.  The urinary bladder is decompressed.  The uterus and adnexa is unremarkable.    There is liquid stool within the rectum.  There is inflammation and wall thickening involving the mid to distal sigmoid colon, with additional regions of inflammation involving the distal/mid descending colon.  The terminal ileum is unremarkable.  The appendix is not confidently identified, no pericecal inflammation.  No focal organized pelvic fluid collection.    Degenerative changes are noted of the spine.  No significant inguinal lymphadenopathy.  Breast prostheses noted.

## 2020-01-27 NOTE — OR NURSING
Pt without change from previous assessment. Continues without c/o pain at this time. Report called to [facility]. Pt placed on transport.

## 2020-01-27 NOTE — PROGRESS NOTES
[facility] Hospital Medicine  Progress Note    Patient Name: Deborah Richmond  MRN: [id number]  Patient Class: OP- Observation   Admission Date: 1/26/2020  Length of Stay: 0 days  Attending Physician: MATTY Mac MD  Primary Care Provider: Primary Doctor No        Subjective:     Principal Problem:Colitis        HPI:  58 y/o female with no significant PMH presents with c/o sudden onset of abdominal pain with associated diarrhea, rectal bleeding and chills. She reports multiple episodes of abdominal cramping followed by rectal bleeding since onset. Patient reports she is visiting and went out last night and had a couple of drinks, nothing out of the ordinary, ate eggs and hash browns last night and felt fine prior to onset of symptoms. Denies fever, nausea, vomiting, CP, SOB, dysuria, rash, vaginal bleeding, hematuria, fatigue, dizziness, or flu-like symptoms. The patient denies hx of hemorrhoids and states her last colonoscopy was normal. She reports alcohol use but denies tobacco or drug use. ED evaluation labs and vitals unremarkable, CT A/P showed Liquid stool within the rectum noting there is upstream wall thickening of the sigmoid colon and distal/mid descending colon suggesting sequela of infectious or inflammatory colitis. Patient placed in Observation.     Overview/Hospital Course:  59 year old female admitted with abdominal pain, diarrhea, hematochezia suspected ischemic colitis.   CT A/P showed  Liquid stool within the rectum noting there is upstream wall thickening of the sigmoid colon and distal/mid descending colon suggesting sequela of infectious or inflammatory colitis. Patient placed on IVF;s, Cipro/Flagyl. GI consulted. Patient underwent colonoscopy 1/27/2020 with findings perianal and digital rectal examinations normal, Discontinuous areas of ulcerated mucosa were present in the sigmoid colon and in the descending colon. Patchy erythema started at 25cm and mild ulceration  at 35cm. The ulcers became larger more proximally. In the descending there were areas where it was circimferential. The scope was intentionally not passed beyond the severe inflammation. Biopsies were taken with a cold forceps for histology. Impression: Mucosal ulceration consistent with ischemic colitis. Biopsied.    Interval History: s/p colonoscopy    Review of Systems   Constitutional: Negative for chills and fever.   Respiratory: Negative for cough and shortness of breath.    Cardiovascular: Negative for chest pain, palpitations and leg swelling.   Gastrointestinal: Positive for abdominal pain. Negative for abdominal distention, blood in stool, diarrhea, nausea and vomiting.   Musculoskeletal: Negative for myalgias.   Neurological: Negative for dizziness, syncope and light-headedness.     Objective:     Vital Signs (Most Recent):  Temp: 98.5 °F (36.9 °C) (01/27/20 0826)  Pulse: 88 (01/27/20 0826)  Resp: 16 (01/27/20 0826)  BP: 132/60 (01/27/20 0810)  SpO2: (!) 70 % (01/27/20 0826) Vital Signs (24h Range):  Temp:  [98.2 °F (36.8 °C)-99 °F (37.2 °C)] 98.5 °F (36.9 °C)  Pulse:  [51-88] 88  Resp:  [16-27] 16  SpO2:  [70 %-100 %] 70 %  BP: (114-151)/(55-80) 132/60     Weight: 70.6 kg (155 lb 10.3 oz)  Body mass index is 24.38 kg/m².    Intake/Output Summary (Last 24 hours) at 1/27/2020 0931  Last data filed at 1/27/2020 0826  Gross per 24 hour   Intake 400 ml   Output 0 ml   Net 400 ml      Physical Exam   Constitutional: She is oriented to person, place, and time. She appears well-developed and well-nourished. No distress.   HENT:   Head: Normocephalic and atraumatic.   Eyes: Conjunctivae are normal. No scleral icterus.   Neck: Normal range of motion. Neck supple.   Cardiovascular: Normal rate, regular rhythm, normal heart sounds and intact distal pulses.   Pulmonary/Chest: Effort normal and breath sounds normal. No respiratory distress. She has no wheezes.   Abdominal: Soft. Bowel sounds are normal. She exhibits  no distension and no mass. There is tenderness (TTP RLQ). There is no rebound.   Musculoskeletal: Normal range of motion. She exhibits no edema.   Neurological: She is alert and oriented to person, place, and time.   Skin: Skin is warm and dry. Capillary refill takes less than 2 seconds. She is not diaphoretic.   Psychiatric: She has a normal mood and affect.   Nursing note and vitals reviewed.      Significant Labs:   CBC:   Recent Labs   Lab 01/26/20  1053 01/27/20  0414   WBC 9.30 9.00   HGB 13.9 13.3   HCT 41.3 41.6    199     CMP:   Recent Labs   Lab 01/26/20  1053 01/27/20  0414    142   K 4.1 4.2    106   CO2 24 28   * 124*   BUN 14 7   CREATININE 0.8 0.8   CALCIUM 9.5 8.9   PROT 7.3 6.3   ALBUMIN 4.3 3.6   BILITOT 0.5 0.9   ALKPHOS 89 70   AST 30 19   ALT 31 22   ANIONGAP 11 8   EGFRNONAA >60 >60     All pertinent labs within the past 24 hours have been reviewed.    Significant Imaging:   Imaging Results           CT Abdomen Pelvis With Contrast (Final result)  Result time 01/26/20 12:34:22    Final result by Tadeo Guo MD (01/26/20 12:34:22)                 Impression:      This report was flagged in Epic as abnormal.    1. Liquid stool within the rectum noting there is upstream wall thickening of the sigmoid colon and distal/mid descending colon suggesting sequela of infectious or inflammatory colitis.  Correlation is advised.  Follow-up colonoscopy is recommended to exclude underlying malignancy.  2. Hepatic steatosis with hepatomegaly, correlation with LFTs recommended.  3. Additional findings above.      Electronically signed by: Tadeo Guo MD  Date:    01/26/2020  Time:    12:34             Narrative:    EXAMINATION:  CT ABDOMEN PELVIS WITH CONTRAST    CLINICAL HISTORY:  GI bleeding;LLQ pain, suspect diverticulitis;    TECHNIQUE:  Low dose axial images, sagittal and coronal reformations were obtained from the lung bases to the pubic symphysis following the IV  administration of 75 mL of Omnipaque 350 .  Oral contrast was not given.    COMPARISON:  None.    FINDINGS:  Images of the lower thorax are remarkable for a calcified granuloma within the right lower lobe.  There is bilateral basilar dependent atelectasis/scarring.    The liver is hypoattenuating, may reflect steatosis, correlation with LFTs recommended.  The liver is enlarged.  The spleen, pancreas, gallbladder and adrenal glands are unremarkable.  There is no biliary dilation or ascites.  The portal vein, splenic vein, SMV, celiac axis and SMA all are patent.  The stomach is decompressed limiting evaluation.  No significant abdominal lymphadenopathy.    The kidneys enhance symmetrically without hydronephrosis or nephrolithiasis.  The bilateral ureters are unremarkable without calculi seen.  Please note, the ureters are unable to be followed in their entirety to the urinary bladder, no secondary findings to suggest obstructive uropathy.  The urinary bladder is decompressed.  The uterus and adnexa is unremarkable.    There is liquid stool within the rectum.  There is inflammation and wall thickening involving the mid to distal sigmoid colon, with additional regions of inflammation involving the distal/mid descending colon.  The terminal ileum is unremarkable.  The appendix is not confidently identified, no pericecal inflammation.  No focal organized pelvic fluid collection.    Degenerative changes are noted of the spine.  No significant inguinal lymphadenopathy.  Breast prostheses noted.                                    Assessment/Plan:      * Colitis  - sudden onset of abd pain associated with rectal bleeding, CT scan 1/26/2020 wall thickening of the sigmoid colon and distal/mid descending colon  - reported last colonoscopy normal  - s/p colonoscopy 1/27/2020 with findings consistent with ischemic colitis, biopsy pending  - clears today as tolerated, pain management  - for now will continue Flagyl and cipro  -  IVF's  - avoid hypotension  - d/w Dr. Stone      Rectal bleeding  - see colitis      VTE Risk Mitigation (From admission, onward)         Ordered     Place sequential compression device  Until discontinued      01/26/20 1400     IP VTE LOW RISK PATIENT  Once      01/26/20 1400                      Rita Smith PA-C  Department of Hospital Medicine   [facility]

## 2020-01-27 NOTE — NURSING
Pt VS remained stable throughout the nite, bowel prep almost finished pt stopped drinking @ 0430. Received call from Endo with estimated  time 0700.

## 2020-01-27 NOTE — [employer] PATIENT INSTRUCTIONS
Discharge Summary/Instructions after an Endoscopic Procedure  Patient Name: Deborah Richmond  Patient MRN: [id number]  Patient YOB: 1960 Monday, January 27, 2020  Paul Stone MD  RESTRICTIONS:  During your procedure today, you received medications for sedation.  These   medications may affect your judgment, balance and coordination.  Therefore,   for 24 hours, you have the following restrictions:   - DO NOT drive a car, operate machinery, make legal/financial decisions,   sign important papers or drink alcohol.    ACTIVITY:  Today: no heavy lifting, straining or running due to procedural   sedation/anesthesia.  The following day: return to full activity including work.  DIET:  Eat and drink normally unless instructed otherwise.     TREATMENT FOR COMMON SIDE EFFECTS:  - Mild abdominal pain, nausea, belching, bloating or excessive gas:  rest,   eat lightly and use a heating pad.  - Sore Throat: treat with throat lozenges and/or gargle with warm salt   water.  - Because air was used during the procedure, expelling large amounts of air   from your rectum or belching is normal.  - If a bowel prep was taken, you may not have a bowel movement for 1-3 days.    This is normal.  SYMPTOMS TO WATCH FOR AND REPORT TO YOUR PHYSICIAN:  1. Abdominal pain or bloating, other than gas cramps.  2. Chest pain.  3. Back pain.  4. Signs of infection such as: chills or fever occurring within 24 hours   after the procedure.  5. Rectal bleeding, which would show as bright red, maroon, or black stools.   (A tablespoon of blood from the rectum is not serious, especially if   hemorrhoids are present.)  6. Vomiting.  7. Weakness or dizziness.  GO DIRECTLY TO THE NEAREST EMERGENCY ROOM IF YOU HAVE ANY OF THE FOLLOWING:      Difficulty breathing              Chills and/or fever over 101 F   Persistent vomiting and/or vomiting blood   Severe abdominal pain   Severe chest pain   Black, tarry stools   Bleeding- more than one  tablespoon   Any other symptom or condition that you feel may need urgent attention  Your doctor recommends these additional instructions:  If any biopsies were taken, your doctors clinic will contact you in 1 to 2   weeks with any results.  - Return patient to hospital [facility] for ongoing care.   - Clear liquid diet.   - Await pathology results.   - Keep well hydrated.  For questions, problems or results please call your physician - Paul Stone MD at Work:  [phone].  [facility], EMERGENCY ROOM PHONE NUMBER: [phone], [facility]   [phone].  IF A COMPLICATION OR EMERGENCY SITUATION ARISES AND YOU ARE UNABLE TO REACH   YOUR PHYSICIAN - GO DIRECTLY TO THE EMERGENCY ROOM.  Paul Stone MD  1/27/2020 8:13:07 AM  This report has been verified and signed electronically.  [employer]

## 2020-01-27 NOTE — ANESTHESIA PREPROCEDURE EVALUATION
01/27/2020  Deborah Richmond is a 59 y.o., female.    Anesthesia Evaluation    I have reviewed the Patient Summary Reports.    I have reviewed the Nursing Notes.   I have reviewed the Medications.     Review of Systems  Anesthesia Hx:  Denies Family Hx of Anesthesia complications.   Denies Personal Hx of Anesthesia complications.   Social:  Non-Smoker    Hematology/Oncology:  Hematology Normal   Oncology Normal     EENT/Dental:EENT/Dental Normal   Cardiovascular:  Cardiovascular Normal     Pulmonary:  Pulmonary Normal    Renal/:  Renal/ Normal     Hepatic/GI:   Hematochezia   Musculoskeletal:  Musculoskeletal Normal    Neurological:  Neurology Normal    Endocrine:  Endocrine Normal    Dermatological:  Skin Normal    Psych:  Psychiatric Normal           Physical Exam  General:  Well nourished    Airway/Jaw/Neck:  Airway Findings: Mouth Opening: Normal Tongue: Normal  General Airway Assessment: Adult  Mallampati: II  TM Distance: Normal, at least 6 cm      Dental:  Dental Findings: In tact        Mental Status:  Mental Status Findings:  Alert and Oriented, Cooperative         Anesthesia Plan  Type of Anesthesia, risks & benefits discussed:  Anesthesia Type:  general  Patient's Preference:   Intra-op Monitoring Plan: standard ASA monitors  Intra-op Monitoring Plan Comments:   Post Op Pain Control Plan: multimodal analgesia  Post Op Pain Control Plan Comments:   Induction:   IV  Beta Blocker:         Informed Consent:  Anesthesia consent signed with patient.  ASA Score: 1     Day of Surgery Review of History & Physical:    H&P update referred to the surgeon.     Anesthesia Plan Notes: HCT 41        Ready For Surgery From Anesthesia Perspective.

## 2020-01-27 NOTE — HOSPITAL COURSE
Iza Richmond is a 59 year old female admitted with abdominal pain, diarrhea, hematochezia suspected ischemic colitis.  Initial imaging with CT abdomen/pelvis revealed wall thickening of the sigmoid colon and distal/mid descending colon suggesting sequela of infectious or inflammatory colitis. She was initially treated with intravenous ciprofloxacin and metronidazole.  Gastroenterology was consulted and patient underwent colonoscopy 1/27/2020 notable for mucosal ulceration consistent with ischemic colitis. Biopsies will be followed after discharge. She continued to improve and was able to tolerate advanced diet.  She was cleared for discharge and will follow up with her Gastroenterologist, Dr. Blanchard, in [place], [place] within one week.  Diagnosis, plan of care and management were discussed with the patient who agreed with plan and was eager for discharge.

## 2020-01-27 NOTE — ASSESSMENT & PLAN NOTE
- sudden onset of abd pain associated with rectal bleeding, CT scan 1/26/2020 wall thickening of the sigmoid colon and distal/mid descending colon  - reported last colonoscopy normal  - s/p colonoscopy 1/27/2020 with findings consistent with ischemic colitis, biopsy pending  - clears today as tolerated, pain management  - for now will continue Flagyl and cipro  - IVF's  - avoid hypotension  - d/w Dr. Stone

## 2020-01-28 VITALS
DIASTOLIC BLOOD PRESSURE: 56 MMHG | TEMPERATURE: 99 F | OXYGEN SATURATION: 96 % | SYSTOLIC BLOOD PRESSURE: 110 MMHG | HEIGHT: 67 IN | HEART RATE: 58 BPM | RESPIRATION RATE: 16 BRPM | WEIGHT: 155.63 LBS | BODY MASS INDEX: 24.43 KG/M2

## 2020-01-28 PROBLEM — K52.9 COLITIS: Status: RESOLVED | Noted: 2020-01-26 | Resolved: 2020-01-28

## 2020-01-28 PROBLEM — K55.9 ISCHEMIC COLITIS: Status: ACTIVE | Noted: 2020-01-28

## 2020-01-28 PROBLEM — K62.5 RECTAL BLEEDING: Status: RESOLVED | Noted: 2020-01-26 | Resolved: 2020-01-28

## 2020-01-28 LAB
ALBUMIN SERPL BCP-MCNC: 3.1 G/DL (ref 3.5–5.2)
ALP SERPL-CCNC: 59 U/L (ref 55–135)
ALT SERPL W/O P-5'-P-CCNC: 18 U/L (ref 10–44)
ANION GAP SERPL CALC-SCNC: 8 MMOL/L (ref 8–16)
AST SERPL-CCNC: 18 U/L (ref 10–40)
BASOPHILS # BLD AUTO: 0.04 K/UL (ref 0–0.2)
BASOPHILS NFR BLD: 0.6 % (ref 0–1.9)
BILIRUB SERPL-MCNC: 0.7 MG/DL (ref 0.1–1)
BUN SERPL-MCNC: 3 MG/DL (ref 6–20)
CALCIUM SERPL-MCNC: 8.4 MG/DL (ref 8.7–10.5)
CHLORIDE SERPL-SCNC: 110 MMOL/L (ref 95–110)
CO2 SERPL-SCNC: 24 MMOL/L (ref 23–29)
CREAT SERPL-MCNC: 0.7 MG/DL (ref 0.5–1.4)
DIFFERENTIAL METHOD: ABNORMAL
EOSINOPHIL # BLD AUTO: 0.1 K/UL (ref 0–0.5)
EOSINOPHIL NFR BLD: 1.8 % (ref 0–8)
ERYTHROCYTE [DISTWIDTH] IN BLOOD BY AUTOMATED COUNT: 12.6 % (ref 11.5–14.5)
EST. GFR  (AFRICAN AMERICAN): >60 ML/MIN/1.73 M^2
EST. GFR  (NON AFRICAN AMERICAN): >60 ML/MIN/1.73 M^2
GLUCOSE SERPL-MCNC: 104 MG/DL (ref 70–110)
HCT VFR BLD AUTO: 38 % (ref 37–48.5)
HGB BLD-MCNC: 12.1 G/DL (ref 12–16)
IMM GRANULOCYTES # BLD AUTO: 0.02 K/UL (ref 0–0.04)
IMM GRANULOCYTES NFR BLD AUTO: 0.3 % (ref 0–0.5)
LYMPHOCYTES # BLD AUTO: 1.5 K/UL (ref 1–4.8)
LYMPHOCYTES NFR BLD: 23.3 % (ref 18–48)
MAGNESIUM SERPL-MCNC: 1.6 MG/DL (ref 1.6–2.6)
MCH RBC QN AUTO: 30.3 PG (ref 27–31)
MCHC RBC AUTO-ENTMCNC: 31.8 G/DL (ref 32–36)
MCV RBC AUTO: 95 FL (ref 82–98)
MONOCYTES # BLD AUTO: 0.4 K/UL (ref 0.3–1)
MONOCYTES NFR BLD: 6.2 % (ref 4–15)
NEUTROPHILS # BLD AUTO: 4.5 K/UL (ref 1.8–7.7)
NEUTROPHILS NFR BLD: 67.8 % (ref 38–73)
NRBC BLD-RTO: 0 /100 WBC
PHOSPHATE SERPL-MCNC: 3 MG/DL (ref 2.7–4.5)
PLATELET # BLD AUTO: 150 K/UL (ref 150–350)
PMV BLD AUTO: 10.1 FL (ref 9.2–12.9)
POTASSIUM SERPL-SCNC: 3.5 MMOL/L (ref 3.5–5.1)
PROT SERPL-MCNC: 5.5 G/DL (ref 6–8.4)
RBC # BLD AUTO: 4 M/UL (ref 4–5.4)
SODIUM SERPL-SCNC: 142 MMOL/L (ref 136–145)
WBC # BLD AUTO: 6.6 K/UL (ref 3.9–12.7)

## 2020-01-28 PROCEDURE — 25000003 PHARM REV CODE 250: Performed by: INTERNAL MEDICINE

## 2020-01-28 PROCEDURE — 63600175 PHARM REV CODE 636 W HCPCS: Performed by: INTERNAL MEDICINE

## 2020-01-28 PROCEDURE — 99217 PR OBSERVATION CARE DISCHARGE: ICD-10-PCS | Mod: ,,, | Performed by: NURSE PRACTITIONER

## 2020-01-28 PROCEDURE — 80053 COMPREHEN METABOLIC PANEL: CPT

## 2020-01-28 PROCEDURE — 84100 ASSAY OF PHOSPHORUS: CPT

## 2020-01-28 PROCEDURE — 83735 ASSAY OF MAGNESIUM: CPT

## 2020-01-28 PROCEDURE — G0378 HOSPITAL OBSERVATION PER HR: HCPCS

## 2020-01-28 PROCEDURE — 85025 COMPLETE CBC W/AUTO DIFF WBC: CPT

## 2020-01-28 PROCEDURE — 36415 COLL VENOUS BLD VENIPUNCTURE: CPT

## 2020-01-28 PROCEDURE — S0030 INJECTION, METRONIDAZOLE: HCPCS | Performed by: INTERNAL MEDICINE

## 2020-01-28 PROCEDURE — 99217 PR OBSERVATION CARE DISCHARGE: CPT | Mod: ,,, | Performed by: NURSE PRACTITIONER

## 2020-01-28 RX ADMIN — METRONIDAZOLE 500 MG: 500 INJECTION, SOLUTION INTRAVENOUS at 09:01

## 2020-01-28 RX ADMIN — METRONIDAZOLE 500 MG: 500 INJECTION, SOLUTION INTRAVENOUS at 12:01

## 2020-01-28 RX ADMIN — CIPROFLOXACIN 400 MG: 2 INJECTION, SOLUTION INTRAVENOUS at 02:01

## 2020-01-28 NOTE — DISCHARGE SUMMARY
[facility] Hospital Medicine  Discharge Summary      Patient Name: Deborah Richmond  MRN: [id number]  Admission Date: 1/26/2020  Hospital Length of Stay: 0 days  Discharge Date and Time:  01/28/2020 1:42 PM  Attending Physician: Stefani Olivares MD   Discharging Provider: Madhavi Jauregui NP  Primary Care Provider: [facility], [facility]      HPI:   58 y/o female with no significant PMH presents with c/o sudden onset of abdominal pain with associated diarrhea, rectal bleeding and chills. She reports multiple episodes of abdominal cramping followed by rectal bleeding since onset. Patient reports she is visiting and went out last night and had a couple of drinks, nothing out of the ordinary, ate eggs and hash browns last night and felt fine prior to onset of symptoms. Denies fever, nausea, vomiting, CP, SOB, dysuria, rash, vaginal bleeding, hematuria, fatigue, dizziness, or flu-like symptoms. The patient denies hx of hemorrhoids and states her last colonoscopy was normal. She reports alcohol use but denies tobacco or drug use. ED evaluation labs and vitals unremarkable, CT A/P showed Liquid stool within the rectum noting there is upstream wall thickening of the sigmoid colon and distal/mid descending colon suggesting sequela of infectious or inflammatory colitis. Patient placed in Observation.     Procedure(s) (LRB):  COLONOSCOPY (N/A)      Hospital Course:   Iza Richmond is a 59 year old female admitted with abdominal pain, diarrhea, hematochezia suspected ischemic colitis.   Initial imaging with CT abdomen/pelvis revealed wall thickening of the sigmoid colon and distal/mid descending colon suggesting sequela of infectious or inflammatory colitis. She was initially treated with intravenous ciprofloxacin and metronidazole.  Gastroenterology was consulted and patient underwent colonoscopy 1/27/2020 notable for mucosal ulceration consistent with ischemic colitis. Biopsies will  be followed after discharge. She continued to improve and was able to tolerate advanced diet.  She was cleared for discharge and will follow up with her Gastroenterologist, Dr. Blanchard, in [place], [place] within one week.  Diagnosis, plan of care and management were discussed with the patient who agreed with plan and was eager for discharge.      Consults:   Consults (From admission, onward)        Status Ordering Provider     Inpatient consult to Gastroenterology  Once     Provider:  Paul Stone MD    Completed CÉSAR FERRARI        Service: Hospital Medicine    Final Active Diagnoses:    Diagnosis Date Noted POA    PRINCIPAL PROBLEM:  Ischemic colitis [K55.9] 01/28/2020 Yes      Problems Resolved During this Admission:    Diagnosis Date Noted Date Resolved POA    Rectal bleeding [K62.5] 01/26/2020 01/28/2020 Yes    Colitis [K52.9] 01/26/2020 01/28/2020 Yes       Discharged Condition: good    Disposition: Home or Self Care    Follow Up:  Follow-up Information     [facility] Schedule an appointment as soon as possible for a visit in 1 week.    Specialty:  Gastroenterology  Why:  Follow up after hospital discharge for continued management of colitis  Contact information:  [address] [place] [id number]  [phone]             Go to PCP.    Why:  See your new PCP in 1-2 weeks               Patient Instructions:      Diet full liquid   Order Comments: Advance to regular diet at tolerated     Notify your health care provider if you experience any of the following:  temperature >100.4     Notify your health care provider if you experience any of the following:  persistent nausea and vomiting or diarrhea     Notify your health care provider if you experience any of the following:  severe uncontrolled pain     Activity as tolerated       Significant Diagnostic Studies: Labs: All labs within the past 24 hours have been reviewed    Pending Diagnostic Studies:      Procedure Component Value Units Date/Time    Specimen to Pathology, Surgery Gastrointestinal tract [[id number]] Collected:  01/27/20 0853    Order Status:  Sent Lab Status:  In process Updated:  01/27/20 1234         Medications:  Reconciled Home Medications:      Medication List      You have not been prescribed any medications.         Time spent on the discharge of patient: <30  minutes  Patient was seen and examined on the date of discharge and determined to be suitable for discharge.         Madhavi Jauregui NP  Department of Hospital Medicine  [facility]

## 2020-01-28 NOTE — NURSING
Pt and pt's  are aware of plan of care to discharge to home. Pt AAOx4, NAD. Pt denies pain, SOB, n/v, dizziness or lightheadedness. Pt denies diarrhea today or blood in stool. VS stable. Pt remains afebrile. No adverse reaction noted to infused abx therapy. Pt tolerated advanced bland diet. Discharge instructions reviewed with pt. Pt voiced understanding. Pt stable for discharge to home. Pt ambulated off of unit without difficulty with pt's . Pt's  will drive them home to [place], [place].

## 2020-01-28 NOTE — PROGRESS NOTES
This lady is feeling better as she has no abdominal pain nausea or signs of diarrhea or blood loss. Colonoscopy was reviewed. No fever chills and she is tolerating a limited diet.    T<100 HR 80  Anicteric no temporal wasting  Neck supple no mass  Heart RR no gallop  Chest clear no wheeze  Heart RR no gallop  Abdomen soft active sounds no masses tenderness tympany or ascites  Extremities no cyanosis or edema  Neuro alert oriented    Labs as reported  Colonoscopy reviewed    Impression: ischemic colitis with marked improvement with regard to symptoms.    Advance diet and if tolerated discharge for OP follow up with her physicians in [facility].  Please re-consult as needed.

## 2020-01-28 NOTE — NURSING
Pt tolerating advanced diet. Pt had cream of wheat and yogurt for breakfast. For lunch, pt had baked chicken and mashed potatoes. Pt denies abd pain, n/v, diarrhea. Pt currently stable. Will continue to monitor pt.

## 2020-01-28 NOTE — NURSING
Pt AAOx4, NAD noted. Pt complained of mild abdominal cramping after clear liquid meals, given tylenol. Vitals monitored and stable. IV fluids currently infusing. Pt able to ambulate without difficulty, remained free from falls. Pt currently in bed, safety measures implemented.  remains at bedside. Will continue to monitor.

## 2020-02-03 LAB
COMMENT: NORMAL
FINAL PATHOLOGIC DIAGNOSIS: NORMAL
GROSS: NORMAL
Lab: NORMAL
